# Patient Record
Sex: FEMALE | Race: BLACK OR AFRICAN AMERICAN | Employment: UNEMPLOYED | URBAN - METROPOLITAN AREA
[De-identification: names, ages, dates, MRNs, and addresses within clinical notes are randomized per-mention and may not be internally consistent; named-entity substitution may affect disease eponyms.]

---

## 2017-11-21 ENCOUNTER — OFFICE VISIT (OUTPATIENT)
Dept: ENDOCRINOLOGY | Age: 27
End: 2017-11-21

## 2017-11-21 VITALS
BODY MASS INDEX: 24.41 KG/M2 | HEIGHT: 63 IN | DIASTOLIC BLOOD PRESSURE: 78 MMHG | WEIGHT: 137.8 LBS | SYSTOLIC BLOOD PRESSURE: 120 MMHG | HEART RATE: 87 BPM

## 2017-11-21 DIAGNOSIS — R63.4 WEIGHT LOSS: ICD-10-CM

## 2017-11-21 DIAGNOSIS — R94.6 ABNORMAL THYROID FUNCTION TEST: Primary | ICD-10-CM

## 2017-11-21 NOTE — PATIENT INSTRUCTIONS
Hashimoto's Thyroiditis: Care Instructions  Your Care Instructions    Hashimoto's thyroiditis is a problem with the thyroid gland. The thyroid gland, which is in your neck, controls the way your body uses energy. Sometimes the disease causes the gland to make too much thyroid hormone (thyrotoxicosis). This can make you feel nervous, lose weight, and have many loose bowel movements. You may also have a fast heartbeat. But as the disease progresses, the gland usually does not make enough thyroid hormone. This can cause you to feel tired and have dry skin and thinning hair. Most people with Hashimoto's are diagnosed when they have these symptoms. You may need to take medicine if you have symptoms or if your thyroid hormone level is not normal. Most people with Hashimoto's thyroiditis need to take medicine for the rest of their lives. Follow-up care is a key part of your treatment and safety. Be sure to make and go to all appointments, and call your doctor if you are having problems. It's also a good idea to know your test results and keep a list of the medicines you take. How can you care for yourself at home? · Take your medicines exactly as prescribed. Call your doctor if you think you are having a problem with your medicine. You will get more details on the specific medicines your doctor prescribes. When should you call for help? Call 911 anytime you think you may need emergency care. For example, call if:  ? · You passed out (lost consciousness). ? · You have severe trouble breathing. ? · You have a very slow heartbeat (less than 60 beats a minute). ? · You have a low body temperature (95°F or below). ? Watch closely for changes in your health, and be sure to contact your doctor if:  ? · You gain weight even though you are eating normally or less than usual.   ? · You feel extremely weak or tired. ? · You have new changes in your skin, nails, or hair, or the changes get worse.    ? · You notice that your thyroid gland has grown or changed in size. ? · You have constipation that is new or that gets worse. ? · You cannot stand cold temperatures. ? · You have heavy or irregular menstrual periods. ? · You have other new symptoms. Where can you learn more? Go to http://chloé-netta.info/. Enter K454 in the search box to learn more about \"Hashimoto's Thyroiditis: Care Instructions. \"  Current as of: May 12, 2017  Content Version: 11.4  © 3254-9635 [employer]po[employer]. Care instructions adapted under license by [employer] (which disclaims liability or warranty for this information). If you have questions about a medical condition or this instruction, always ask your healthcare professional. [address] any warranty or liability for your use of this information.

## 2017-11-21 NOTE — MR AVS SNAPSHOT
Visit Information Date & Time Provider Department Dept. Phone Encounter #  
 11/21/2017  2:50 PM Axel Villar, [address] [facility] [phone] [id number] Follow-up Instructions Return in about 3 months (around 2/21/2018). Your Appointments 2/27/2018  3:50 PM  
Follow Up with MD Jignesh [facility] Appt Note: f/u           dm         3 month  
 [address] [facility] [address] [id number] [address] [facility] Upcoming Health Maintenance Date Due  
 HPV AGE 9Y-34Y (1 of 3 - Female 3 Dose Series) 8/28/2003 DTaP/Tdap/Td series (1 - Tdap) 8/28/2013 PAP AKA CERVICAL CYTOLOGY 8/28/2013 Influenza Age 5 to Adult 8/1/2017 Allergies as of 11/21/2017  Review Complete On: 11/21/2017 By: Axel Villar MD  
 No Known Allergies Current Immunizations  Never Reviewed No immunizations on file. Not reviewed this visit You Were Diagnosed With   
  
 Codes Comments Abnormal thyroid function test    -  Primary ICD-10-CM: R94.6 ICD-9-CM: 794.5 Weight loss     ICD-10-CM: R63.4 ICD-9-CM: 783.21 Vitals BP Pulse Height(growth percentile) Weight(growth percentile) BMI Smoking Status 120/78 87 5' 3\" (1.6 m) 137 lb 12.8 oz (62.5 kg) 24.41 kg/m2 Never Smoker Vitals History BMI and BSA Data Body Mass Index Body Surface Area  
 24.41 kg/m 2 1.67 m 2 Preferred Pharmacy Pharmacy Name Phone [facility] [address] [facility] [address] [facility] [phone] Your Updated Medication List  
  
   
This list is accurate as of: 11/21/17  3:31 PM.  Always use your most recent med list.  
  
  
  
  
 sertraline 50 mg tablet Commonly known as:  ZOLOFT Take  by mouth daily. We Performed the Following MD COLLECTION VENOUS BLOOD,VENIPUNCTURE L8699419 CPT(R)] SPECIMEN HANDLING, OFF->LAB [52961 CPT(R)]   
 T3 TOTAL [id number] CPT(R)] T4, FREE [id number] CPT(R)] THYROID ANTIBODY PANEL [56133 CPT(R)] THYROID STIMULATING IMMUNOGLOBULIN [id number] CPT(R)] TSH 3RD GENERATION [46884 CPT(R)] TSH RECEPTOR AB [03245 CPT(R)] Follow-up Instructions Return in about 3 months (around 2/21/2018). Patient Instructions Hashimoto's Thyroiditis: Care Instructions Your Care Instructions Hashimoto's thyroiditis is a problem with the thyroid gland. The thyroid gland, which is in your neck, controls the way your body uses energy. Sometimes the disease causes the gland to make too much thyroid hormone (thyrotoxicosis). This can make you feel nervous, lose weight, and have many loose bowel movements. You may also have a fast heartbeat. But as the disease progresses, the gland usually does not make enough thyroid hormone. This can cause you to feel tired and have dry skin and thinning hair. Most people with Hashimoto's are diagnosed when they have these symptoms. You may need to take medicine if you have symptoms or if your thyroid hormone level is not normal. Most people with Hashimoto's thyroiditis need to take medicine for the rest of their lives. Follow-up care is a key part of your treatment and safety. Be sure to make and go to all appointments, and call your doctor if you are having problems. It's also a good idea to know your test results and keep a list of the medicines you take. How can you care for yourself at home? · Take your medicines exactly as prescribed. Call your doctor if you think you are having a problem with your medicine. You will get more details on the specific medicines your doctor prescribes. When should you call for help? Call 911 anytime you think you may need emergency care. For example, call if: 
? · You passed out (lost consciousness). ? · You have severe trouble breathing. ? · You have a very slow heartbeat (less than 60 beats a minute). ? · You have a low body temperature (95°F or below). ? Watch closely for changes in your health, and be sure to contact your doctor if: 
? · You gain weight even though you are eating normally or less than usual.  
? · You feel extremely weak or tired. ? · You have new changes in your skin, nails, or hair, or the changes get worse. ? · You notice that your thyroid gland has grown or changed in size. ? · You have constipation that is new or that gets worse. ? · You cannot stand cold temperatures. ? · You have heavy or irregular menstrual periods. ? · You have other new symptoms. Where can you learn more? Go to http://chloé-netta.info/. Enter K454 in the search box to learn more about \"Hashimoto's Thyroiditis: Care Instructions. \" Current as of: May 12, 2017 Content Version: 11.4 © 9976-9580 Actionality. Care instructions adapted under license by [employer] (which disclaims liability or warranty for this information). If you have questions about a medical condition or this instruction, always ask your healthcare professional. [address] any warranty or liability for your use of this information. Introducing [facility]! Della Goodman introduces Welliko patient portal. Now you can access parts of your medical record, email your doctor's office, and request medication refills online. 1. In your internet browser, go to [email or web address] 2. Click on the First Time User? Click Here link in the Sign In box. You will see the New Member Sign Up page. 3. Enter your Welliko Access Code exactly as it appears below. You will not need to use this code after youve completed the sign-up process. If you do not sign up before the expiration date, you must request a new code. · SpringCM Access Code: [email or web address] Expires: 1/12/2018  5:19 PM 
 
4. Enter the last four digits of your Social Security Number (xxxx) and Date of Birth (mm/dd/yyyy) as indicated and click Submit. You will be taken to the next sign-up page. 5. Create a SpringCM ID. This will be your SpringCM login ID and cannot be changed, so think of one that is secure and easy to remember. 6. Create a SpringCM password. You can change your password at any time. 7. Enter your Password Reset Question and Answer. This can be used at a later time if you forget your password. 8. Enter your e-mail address. You will receive e-mail notification when new information is available in [address] 9. Click Sign Up. You can now view and download portions of your medical record. 10. Click the Download Summary menu link to download a portable copy of your medical information. If you have questions, please visit the Frequently Asked Questions section of the SpringCM website. Remember, SpringCM is NOT to be used for urgent needs. For medical emergencies, dial 911. Now available from your iPhone and Android! Please provide this summary of care documentation to your next provider. Your primary care clinician is listed as Willena Elk. If you have any questions after today's visit, please call [phone].

## 2017-11-21 NOTE — PROGRESS NOTES
Chief Complaint   Patient presents with    Thyroid Problem     pcp    Records reviewed  History of Present Illness: Sharon Davidson is a 24 y.o. female who I was asked to see in consult by Dr. Pauline Bethea for evaluation of weight loss and concern for possible thyroid problems. Pt is Autistic, but is moderate functioning. Her father notes that her weight loss issues began about a year ago, she had lost about 30 pounds in 8 months. Her PCP has been working her up for causes of her weight loss and \"they were off and she thought that could be contributing to her weight issues\". Her father notes she was down to 133 and is back up to 137 pounds. In April 2017 her TSH was 1.09 with FT4 of 0.07. In October 2017 her PCP checked labs and her TSH was 0.452 with FT4 of 1.69. Her father denies any complaints of CP, SOB, pain with swallowing, sore throat. He denies any recent issues of cough, cold or recent viral URI/viral infections. He denies any recent changes in mood from her baseline. She is on sertraline for mood issues and her father notes she has always had a tremor or movement issues because of her autism. Her father notes that she tends to get a rash on her neck and she was told that Mai Mendoza may be allergic to herself\" the rash is always on the left side of her neck, behind her ears. \"    ROS and history obtained from her father. Past Medical History:   Diagnosis Date    Autism     Psychiatric disorder     depression     No past surgical history on file. Current Outpatient Prescriptions   Medication Sig    sertraline (ZOLOFT) 50 mg tablet Take  by mouth daily. No current facility-administered medications for this visit.       No Known Allergies  Family History   Problem Relation Age of Onset    Hypertension Father     Hypertension Paternal Grandmother     Cancer Paternal Grandfather      lung     Social History     Social History    Marital status: SINGLE     Spouse name: N/A    Number of children: N/A    Years of education: N/A     Occupational History    Not on file. Social History Main Topics    Smoking status: Never Smoker    Smokeless tobacco: Never Used    Alcohol use No    Drug use: Not on file    Sexual activity: Not on file     Other Topics Concern    Not on file     Social History Narrative     Review of Systems:  - Constitutional Symptoms: no fevers, chills, weight loss  - Eyes: no blurry vision or double vision  - Cardiovascular: no chest pain or palpitations  - Respiratory: no cough or shortness of breath  - Gastrointestinal: no dysphagia or abdominal pain  - Musculoskeletal: no joint pains or weakness  - Integumentary: no rashes  - Neurological: no numbness, tingling, or headaches  - Psychiatric: no depression or anxiety  - Endocrine: no heat or cold intolerance, no polyuria or polydipsia    Physical Examination:  Blood pressure 120/78, pulse 87, height 5' 3\" (1.6 m), weight 137 lb 12.8 oz (62.5 kg).   - General: pleasant, no distress, good eye contact  - HEENT: no exopthalmos, no periorbital edema, no scleral/conjunctival injection, EOMI, no lid lag or stare  - Neck: supple, no thyromegaly, masses, lymph nodes, or carotid bruits, no supraclavicular or dorsocervical fat pads  - Cardiovascular: regular, normal rate, normal S1 and S2, no murmurs/rubs/gallops   - Respiratory: clear to auscultation bilaterally  - Gastrointestinal: soft, nontender, nondistended, no masses, no hepatosplenomegaly  - Musculoskeletal: no proximal muscle weakness in upper or lower extremities  - Integumentary: no acanthosis nigricans, no abdominal striae, no rashes, no edema  - Neurological: reflexes 2+ at biceps, no tremor  - Psychiatric: normal mood and affect    Data Reviewed:   Component      Latest Ref Rng & Units 4/19/2017 4/19/2017 4/19/2017 4/19/2017           7:15 PM  7:15 PM  7:15 PM  7:15 PM   Sodium      136 - 145 mmol/L    143   Potassium      3.5 - 5.1 mmol/L    3.1 (L)   Chloride      97 - 108 mmol/L    106   CO2      21 - 32 mmol/L    23   Anion gap      5 - 15 mmol/L    14   Glucose      65 - 100 mg/dL    112 (H)   BUN      7.0 - 18.0 MG/DL    11   Creatinine      0.55 - 1.02 MG/DL    0.72   BUN/Creatinine ratio      12 - 20      15   GFR est AA      >60 ml/min/1.73m2    >60   GFR est non-AA      >60 ml/min/1.73m2    >60   Calcium      8.5 - 10.1 MG/DL    8.7   Bilirubin, total      0.2 - 1.0 MG/DL    0.4   ALT (SGPT)      14 - 63 U/L    18   AST      15 - 37 U/L    10 (L)   Alk. phosphatase      45 - 117 U/L    77   Protein, total      6.4 - 8.2 g/dL    7.9   Albumin      3.5 - 5.0 g/dL    3.8   Globulin      2.0 - 4.0 g/dL    4.1 (H)   A-G Ratio      1.1 - 2.2      0.9 (L)   Magnesium      1.6 - 2.4 mg/dL   2.1    TSH      0.34 - 4.82 uIU/mL  1.09     T4, Free      0.59 - 1.17 NG/DL 0.7          Assessment/Plan:   1. Abnormal thyroid function test    2. Weight loss    1) Her TSH was normal in April and high in October, this makes me think she could be having issues of thyroiditis, which could cause erratic thyroid labs with periods of hyperthyroid followed by periods of hypothyroidism. Will check TSH, FT4, TT3, Thyroid Ab panel, TSI and TRAb looking for underlying cause of her thyroid abnormality. 2) For her issues of weight loss, will start by addressing #1 and if she continues to have weight loss with normal thyroid function then we can look for other endocrine causes of weight loss. Pt's father voices understanding and agreement with the plan. RTC 3 months    Will call her father Skyler Dubois) [phone] or [phone] with results. Patient Instructions          Hashimoto's Thyroiditis: Care Instructions  Your Care Instructions    Hashimoto's thyroiditis is a problem with the thyroid gland. The thyroid gland, which is in your neck, controls the way your body uses energy. Sometimes the disease causes the gland to make too much thyroid hormone (thyrotoxicosis).  This can make you feel nervous, lose weight, and have many loose bowel movements. You may also have a fast heartbeat. But as the disease progresses, the gland usually does not make enough thyroid hormone. This can cause you to feel tired and have dry skin and thinning hair. Most people with Hashimoto's are diagnosed when they have these symptoms. You may need to take medicine if you have symptoms or if your thyroid hormone level is not normal. Most people with Hashimoto's thyroiditis need to take medicine for the rest of their lives. Follow-up care is a key part of your treatment and safety. Be sure to make and go to all appointments, and call your doctor if you are having problems. It's also a good idea to know your test results and keep a list of the medicines you take. How can you care for yourself at home? · Take your medicines exactly as prescribed. Call your doctor if you think you are having a problem with your medicine. You will get more details on the specific medicines your doctor prescribes. When should you call for help? Call 911 anytime you think you may need emergency care. For example, call if:  ? · You passed out (lost consciousness). ? · You have severe trouble breathing. ? · You have a very slow heartbeat (less than 60 beats a minute). ? · You have a low body temperature (95°F or below). ? Watch closely for changes in your health, and be sure to contact your doctor if:  ? · You gain weight even though you are eating normally or less than usual.   ? · You feel extremely weak or tired. ? · You have new changes in your skin, nails, or hair, or the changes get worse. ? · You notice that your thyroid gland has grown or changed in size. ? · You have constipation that is new or that gets worse. ? · You cannot stand cold temperatures. ? · You have heavy or irregular menstrual periods. ? · You have other new symptoms. Where can you learn more?   Go to http://chloé-netta.info/. Enter K454 in the search box to learn more about \"Hashimoto's Thyroiditis: Care Instructions. \"  Current as of: May 12, 2017  Content Version: 11.4  © 5738-9963 [employer]. Care instructions adapted under license by [employer] (which disclaims liability or warranty for this information). If you have questions about a medical condition or this instruction, always ask your healthcare professional. [place] [address] any warranty or liability for your use of this information. Follow-up Disposition:  Return in about 3 months (around 2/21/2018).     Copy sent to:  Dr. Bogdan Downs

## 2017-11-21 NOTE — LETTER
11/21/2017 3:30 PM 
 
Patient:  Shlomo De La Torre YOB: 1990 Date of Visit: 11/21/2017 Dear Roberto Block, DONY 
[address] [facility] [address] V[address] Facsimile: [phone] 
 : Thank you for referring Ms. Shlomo De La Torre to me for evaluation/treatment. Below are the relevant portions of my assessment and plan of care. If you have questions, please do not hesitate to call me. I look forward to following Ms. Halina Stokeselías along with you. Sincerely, Filiberto Munoz MD

## 2017-11-28 LAB
T3 SERPL-MCNC: 92 NG/DL (ref 71–180)
T4 FREE SERPL-MCNC: 1.17 NG/DL (ref 0.82–1.77)
THYROGLOB AB SERPL-ACNC: <1 IU/ML (ref 0–0.9)
THYROPEROXIDASE AB SERPL-ACNC: 11 IU/ML (ref 0–34)
TSH RECEP AB SER-ACNC: <0.5 IU/L (ref 0–1.75)
TSH SERPL DL<=0.005 MIU/L-ACNC: 1.89 UIU/ML (ref 0.45–4.5)
TSI ACT/NOR SER: 58 % (ref 0–139)

## 2017-11-29 NOTE — PROGRESS NOTES
Letter sent to her father. Ms Lugo's thyroid labs are normal. I suspect she has an episode of thyroiditis, which has since resolved, with normalization of her thyroid function. No medications or treatments are needed at this time.

## 2018-02-27 ENCOUNTER — OFFICE VISIT (OUTPATIENT)
Dept: ENDOCRINOLOGY | Age: 28
End: 2018-02-27

## 2018-02-27 VITALS
BODY MASS INDEX: 23.88 KG/M2 | SYSTOLIC BLOOD PRESSURE: 132 MMHG | WEIGHT: 134.8 LBS | DIASTOLIC BLOOD PRESSURE: 76 MMHG | HEIGHT: 63 IN | HEART RATE: 106 BPM

## 2018-02-27 DIAGNOSIS — R63.4 WEIGHT LOSS: Primary | ICD-10-CM

## 2018-02-27 NOTE — PROGRESS NOTES
Chief Complaint   Patient presents with    Thyroid Problem     pcp and pharmacy verified   Records since last visit reviewed. History of Present Illness: Bubba Gonzalez is a 24 y.o. female here for follow up of evaluation of weight loss and concern for possible thyroid problems. Pt is Autistic, but is moderate functioning. History was provided by her father. Her father notes that her weight loss issues began about a year ago, she had lost about 30 pounds in 8 months. Her PCP has been working her up for causes of her weight loss and \"they were off and she thought that could be contributing to her weight issues\". In April 2017 her TSH was 1.09 with FT4 of 0.07. In October 2017 her PCP checked labs and her TSH was 0.452 with FT4 of 1.69. At our initial visit in November 2017 evaluated for abnormalities in her TFTs, evidence of Graves' or Hashimoto's. All of her labs came back normal.  This suggested that she could have had an episode of thyroiditis that had since resolved. Today pt weighs 134, which is down 3 pounds from our last visit in November 2017. Her BMI is 23.9. She has follow up with her PCP tomorrow. Her father notes that she is only eating one or two meals per day. She typically eats frozen meals and hot pockets. Her father notes that she has been having issues of nasal congestion and sinus issues. Pt and her father denies issues of polyuria, polydipsia, N/V, lightheadedness, syncope, pre-syncope, HA, CP, SOB, palpitations, tremors, diarrhea, constipation. She is on Depo-Provera. Current Outpatient Prescriptions   Medication Sig    sertraline (ZOLOFT) 50 mg tablet Take  by mouth daily. No current facility-administered medications for this visit.       No Known Allergies  Review of Systems:  - Cardiovascular: no chest pain  - Neurological: no tremors  - Integumentary: skin is normal    Physical Examination:  Blood pressure 132/76, pulse (!) 106, height 5' 3\" (1.6 m), weight 134 lb 12.8 oz (61.1 kg). - General: pleasant, no distress, good eye contact   - Neck: no thyromegaly or thyroid bruits  - Cardiovascular: regular, normal rate, nl s1 and s2, no m/r/g   - Integumentary: skin is normal, no edema  - Neurological: reflexes 2+ at biceps, no tremors  - Psychiatric: normal mood and affect    Data Reviewed:   - none new for review    Assessment/Plan:   1) Weight loss > I explained to her father that her weight is actually in a healthy range for a woman of her age and height (BMI 23.9). He is concerned that she is underweight and has not \"filled out the way she used to be\". She does not have signs or symptoms concerning for cushing, adrenal insufficiency, hyperthyroidism, acromegaly or diabetes. To be through will check an A1C, repeat TFTs, A1C and metanephrine levels. I recommended her father  Boost or Ensure meal replacement shakes and give her one per day. This will give her protein, nutrients as well as some extra calories. I also emphasized that I felt she was at a healthy weight. RTC based on the results of the above labs.     Copy sent to:  Dr. Rojas Lovelace

## 2018-02-27 NOTE — MR AVS SNAPSHOT
[address] [facility] [address] [address] [phone] Patient: Eulalia Gallardo MRN: [id number] Nazia Mendoza Visit Information Date & Time Provider Department Dept. Phone Encounter #  
 2/27/2018  3:50 PM Marleny Neal, [facility] [id number] [phone] Your Appointments 2/27/2018  3:50 PM  
Follow Up with Marleny Neal MD  
[facility] [address] [place] [address]) Appt Note: f/u           dm         3 month  
 [address] [facility] [address] [address] [address] [facility] Upcoming Health Maintenance Date Due  
 HPV AGE 9Y-34Y (1 of 3 - Female 3 Dose Series) 8/28/2003 DTaP/Tdap/Td series (1 - Tdap) 8/28/2013 PAP AKA CERVICAL CYTOLOGY 8/28/2013 Allergies as of 2/27/2018  Review Complete On: 2/27/2018 By: Erasmo Lugo LPN No Known Allergies Current Immunizations  Never Reviewed No immunizations on file. Not reviewed this visit You Were Diagnosed With   
  
 Codes Comments Weight loss    -  Primary ICD-10-CM: R63.4 ICD-9-CM: 783.21 Vitals BP Pulse Height(growth percentile) Weight(growth percentile) BMI Smoking Status 132/76 (!) 106 5' 3\" (1.6 m) 134 lb 12.8 oz (61.1 kg) 23.88 kg/m2 Never Smoker Vitals History BMI and BSA Data Body Mass Index Body Surface Area  
 23.88 kg/m 2 1.65 m 2 Preferred Pharmacy Pharmacy Name Phone [facility] [place] [address] [facility] [phone] Your Updated Medication List  
  
   
This list is accurate as of 2/27/18  3:48 PM.  Always use your most recent med list.  
  
  
  
  
 sertraline 50 mg tablet Commonly known as:  ZOLOFT Take  by mouth daily. We Performed the Following HEMOGLOBIN A1C WITH EAG [44238 CPT(R)] IGF-1 [id number] CPT(R)] METABOLIC PANEL, COMPREHENSIVE [02356 CPT(R)] METANEPHRINES, PLASMA [14850 CPT(R)]   
 T3 TOTAL [52041 CPT(R)] T4, FREE [id number] CPT(R)] TSH 3RD GENERATION [80271 CPT(R)] Patient Instructions 1) Boost meal replacement shakes. 2) Ensure Introducing [facility]! Kath Rivas introduces RawData patient portal. Now you can access parts of your medical record, email your doctor's office, and request medication refills online. 1. In your internet browser, go to [email or web address] 2. Click on the First Time User? Click Here link in the Sign In box. You will see the New Member Sign Up page. 3. Enter your RawData Access Code exactly as it appears below. You will not need to use this code after youve completed the sign-up process. If you do not sign up before the expiration date, you must request a new code. · RawData Access Code: [email or web address] Expires: 5/28/2018  3:48 PM 
 
4. Enter the last four digits of your Social Security Number (xxxx) and Date of Birth (mm/dd/yyyy) as indicated and click Submit. You will be taken to the next sign-up page. 5. Create a RawData ID. This will be your RawData login ID and cannot be changed, so think of one that is secure and easy to remember. 6. Create a [employer]Data password. You can change your password at any time. 7. Enter your Password Reset Question and Answer. This can be used at a later time if you forget your password. 8. Enter your e-mail address. You will receive e-mail notification when new information is available in [address] 9. Click Sign Up. You can now view and download portions of your medical record. 10. Click the Download Summary menu link to download a portable copy of your medical information. If you have questions, please visit the Frequently Asked Questions section of the [employer] website.  Remember, RawData is NOT to be used for urgent needs. For medical emergencies, dial 911. Now available from your iPhone and Android! Please provide this summary of care documentation to your next provider. Your primary care clinician is listed as Lucero White. If you have any questions after today's visit, please call [phone].

## 2018-03-04 LAB
ALBUMIN SERPL-MCNC: 4.7 G/DL (ref 3.5–5.5)
ALBUMIN/GLOB SERPL: 1.8 {RATIO} (ref 1.2–2.2)
ALP SERPL-CCNC: 57 IU/L (ref 39–117)
ALT SERPL-CCNC: 6 IU/L (ref 0–32)
AST SERPL-CCNC: 14 IU/L (ref 0–40)
BILIRUB SERPL-MCNC: 0.4 MG/DL (ref 0–1.2)
BUN SERPL-MCNC: 12 MG/DL (ref 6–20)
BUN/CREAT SERPL: 16 (ref 9–23)
CALCIUM SERPL-MCNC: 9.4 MG/DL (ref 8.7–10.2)
CHLORIDE SERPL-SCNC: 102 MMOL/L (ref 96–106)
CO2 SERPL-SCNC: 22 MMOL/L (ref 18–29)
CREAT SERPL-MCNC: 0.76 MG/DL (ref 0.57–1)
EST. AVERAGE GLUCOSE BLD GHB EST-MCNC: 94 MG/DL
GFR SERPLBLD CREATININE-BSD FMLA CKD-EPI: 109 ML/MIN/1.73
GFR SERPLBLD CREATININE-BSD FMLA CKD-EPI: 126 ML/MIN/1.73
GLOBULIN SER CALC-MCNC: 2.6 G/DL (ref 1.5–4.5)
GLUCOSE SERPL-MCNC: 96 MG/DL (ref 65–99)
HBA1C MFR BLD: 4.9 % (ref 4.8–5.6)
IGF-I SERPL-MCNC: 363 NG/ML
METANEPH FREE SERPL-MCNC: 19 PG/ML (ref 0–62)
NORMETANEPHRINE SERPL-MCNC: 67 PG/ML (ref 0–145)
POTASSIUM SERPL-SCNC: 3.7 MMOL/L (ref 3.5–5.2)
PROT SERPL-MCNC: 7.3 G/DL (ref 6–8.5)
SODIUM SERPL-SCNC: 141 MMOL/L (ref 134–144)
T3 SERPL-MCNC: 86 NG/DL (ref 71–180)
T4 FREE SERPL-MCNC: 1.05 NG/DL (ref 0.82–1.77)
TSH SERPL DL<=0.005 MIU/L-ACNC: 2.78 UIU/ML (ref 0.45–4.5)

## 2018-12-01 ENCOUNTER — HOSPITAL ENCOUNTER (EMERGENCY)
Age: 28
Discharge: HOME | End: 2018-12-01
Attending: EMERGENCY MEDICINE
Payer: MEDICAID

## 2018-12-01 VITALS
WEIGHT: 165.34 LBS | OXYGEN SATURATION: 99 % | SYSTOLIC BLOOD PRESSURE: 159 MMHG | HEIGHT: 63 IN | HEART RATE: 110 BPM | DIASTOLIC BLOOD PRESSURE: 97 MMHG | RESPIRATION RATE: 16 BRPM | TEMPERATURE: 97.7 F | BODY MASS INDEX: 29.3 KG/M2

## 2018-12-01 DIAGNOSIS — L25.9 CONTACT DERMATITIS AND ECZEMA: ICD-10-CM

## 2018-12-01 DIAGNOSIS — L01.00 IMPETIGO: Primary | ICD-10-CM

## 2018-12-01 PROCEDURE — 99283 EMERGENCY DEPT VISIT LOW MDM: CPT

## 2018-12-01 PROCEDURE — 74011250637 HC RX REV CODE- 250/637: Performed by: EMERGENCY MEDICINE

## 2018-12-01 RX ORDER — MUPIROCIN 20 MG/G
OINTMENT TOPICAL 3 TIMES DAILY
Qty: 22 G | Refills: 0 | Status: SHIPPED | OUTPATIENT
Start: 2018-12-01 | End: 2021-07-14

## 2018-12-01 RX ORDER — HYDROXYZINE 25 MG/1
25 TABLET, FILM COATED ORAL
Qty: 20 TAB | Refills: 0 | Status: SHIPPED | OUTPATIENT
Start: 2018-12-01 | End: 2018-12-11

## 2018-12-01 RX ORDER — HYDROXYZINE 25 MG/1
25 TABLET, FILM COATED ORAL
Status: COMPLETED | OUTPATIENT
Start: 2018-12-01 | End: 2018-12-01

## 2018-12-01 RX ADMIN — HYDROXYZINE HYDROCHLORIDE 25 MG: 25 TABLET, FILM COATED ORAL at 15:00

## 2018-12-01 NOTE — ED NOTES
Discharge paper work given to patient by Mohamud Ferrell MD patients questions and concerns answered. Patient ambulatory at time of discharge with no difficulty. Patient discharged

## 2018-12-01 NOTE — ED PROVIDER NOTES
Patient Name: Yao Gould History of Presenting Illness Chief Complaint Patient presents with  Rash Ambulatory into the ED with c/o dry/flaky rash to face and chest x 11/21. History Provided By: Patient and Patient's Father HPI: Yao Gould, 34 y.o. female with PMHx significant for autism, presents ambulatory to the ED with cc of new onset rash which began  11/21/18 and worsened yesterday prompting ED visit today. Father states rash originally began on the back of the neck and has migrated around the neck and up to the face and ears. Pt's father denies any recent changes in medications and allergies. Pt's was seen at [facility] ED on 11/24/2018. Pt at the time had been using eczema cream with no relief. Pt was prescribed keflex, but has finished with no relief in sx. Pt specifically denies neck pain, SOB, and chest pain. Social Hx: - Tobacco, - EtOH, - Illicit Drugs There are no other complaints, changes, or physical findings at this time. PCP: Pearl Gallagher NP Current Outpatient Medications Medication Sig Dispense Refill  mupirocin (BACTROBAN) 2 % ointment Apply  to affected area three (3) times daily. Apply to area for 10 days 22 g 0  
 hydrOXYzine HCl (ATARAX) 25 mg tablet Take 1 Tab by mouth every six (6) hours as needed for Itching for up to 10 days. 20 Tab 0  predniSONE (STERAPRED) 5 mg dose pack As directed. May fill with loose pills and appropriate dosing instructions if prepackaged dose pack unavailable. 48 Tab 0  cephALEXin (KEFLEX) 500 mg capsule Take 1 Cap by mouth three (3) times daily. 15 Cap 0  
 sertraline (ZOLOFT) 50 mg tablet Take  by mouth daily. Past History Past Medical History: 
Past Medical History:  
Diagnosis Date  ADD (attention deficit disorder)   
 from [facility] Hx  Autism  Moderate mental retardation   
 from [facility] History  Psychiatric disorder   
 depression Past Surgical History: No past surgical history on file. Family History: 
Family History Problem Relation Age of Onset  Hypertension Father  Hypertension Paternal Grandmother  Cancer Paternal Grandfather   
     lung Social History: 
Social History Tobacco Use  Smoking status: Never Smoker  Smokeless tobacco: Never Used Substance Use Topics  Alcohol use: No  
 Drug use: Not on file Allergies: 
No Known Allergies Review of Systems Review of Systems Constitutional: Negative for fever. HENT: Negative for sore throat. Eyes: Negative. Respiratory: Negative for shortness of breath. Cardiovascular: Negative for chest pain. Gastrointestinal: Negative. Endocrine: Negative for heat intolerance. Genitourinary: Negative for dysuria. Musculoskeletal: Negative for back pain and neck pain. Skin: Positive for rash (neck, ears, face). Allergic/Immunologic: Negative for immunocompromised state. Neurological: Negative. Hematological: Does not bruise/bleed easily. Psychiatric/Behavioral: Negative. All other systems reviewed and are negative. Physical Exam  
Physical Exam  
Constitutional: She is oriented to person, place, and time. She appears well-developed and well-nourished. No distress. HENT:  
Head: Normocephalic and atraumatic. Mouth/Throat: Oropharynx is clear and moist.  
Eyes: EOM are normal. Pupils are equal, round, and reactive to light. Neck: Normal range of motion. Cardiovascular: Normal rate, regular rhythm and normal heart sounds. Pulmonary/Chest: Effort normal and breath sounds normal. No respiratory distress. Abdominal: Soft. Bowel sounds are normal. She exhibits no mass. There is no tenderness. Musculoskeletal: Normal range of motion. She exhibits no edema. Neurological: She is alert and oriented to person, place, and time. Coordination normal.  
Skin: Skin is warm and dry.  Rash (eczematous appearing rash on face, neck, and ears with crusting on the neck and ears) noted. Psychiatric: She has a normal mood and affect. Her behavior is normal.  
Nursing note and vitals reviewed. Medical Decision Making I am the first provider for this patient. I reviewed the vital signs, available nursing notes, past medical history, past surgical history, family history and social history. Vital Signs-Reviewed the patient's vital signs. Patient Vitals for the past 12 hrs: 
 Temp Pulse Resp BP SpO2  
12/01/18 1259 97.7 °F (36.5 °C) (!) 110 16 (!) 159/97 99 % Pulse Oximetry Analysis - 99% on RA Cardiac Monitor:  
Rate: 110 bpm 
Rhythm: Sinus Tachycardia Records Reviewed: Nursing Notes, Old Medical Records and Previous Radiology Studies Provider Notes (Medical Decision Making): DDx: Contact dermatitis, impetigo, allergic rxn ED Course:  
Initial assessment performed. The patients presenting problems have been discussed, and they are in agreement with the care plan formulated and outlined with them. I have encouraged them to ask questions as they arise throughout their visit. Critical Care Time: 0 minutes Disposition: 
Discharge Note: 
3:04 PM 
The pt is ready for discharge. The pt's signs, symptoms, diagnosis, and discharge instructions have been discussed and pt has conveyed their understanding. The pt is to follow up as recommended or return to ER should their symptoms worsen. Plan has been discussed and pt is in agreement. PLAN: 
1. Discharge Home Current Discharge Medication List  
  
START taking these medications Details  
mupirocin (BACTROBAN) 2 % ointment Apply  to affected area three (3) times daily. Apply to area for 10 days Qty: 22 g, Refills: 0  
  
hydrOXYzine HCl (ATARAX) 25 mg tablet Take 1 Tab by mouth every six (6) hours as needed for Itching for up to 10 days. Qty: 20 Tab, Refills: 0  
  
  
 
2. Follow-up Information Follow up With Specialties Details Why Contact Florinda Burk NP Nurse Practitioner In 2 days As needed [address] [facility] 
[phone] Leonides Bae, DO Dermatology  As needed [address] [facility] 7 [id number] [phone] [facility] EMERGENCY DEPT Emergency Medicine  If symptoms worsen [address] [facility] 
[phone] Return to ED if worse Diagnosis Clinical Impression: 1. Impetigo 2. Contact dermatitis and eczema Attestations: This note is prepared by Lucas Velasco, acting as Scribe for Elke Frost MD. 
 
The scribe's documentation has been prepared under my direction and personally reviewed by me in its entirety. I confirm that the note above accurately reflects all work, treatment, procedures, and medical decision making performed by me.  
Elke Frost MD

## 2018-12-01 NOTE — DISCHARGE INSTRUCTIONS
Dermatitis: Care Instructions  Your Care Instructions  Dermatitis is the general name used for any rash or inflammation of the skin. Different kinds of dermatitis cause different kinds of rashes. Common causes of a rash include new medicines, plants (such as poison oak or poison ivy), heat, and stress. Certain illnesses can also cause a rash. An allergic reaction to something that touches your skin, such as latex, nickel, or poison ivy, is called contact dermatitis. Contact dermatitis may also be caused by something that irritates the skin, such as bleach, a chemical, or soap. These types of rashes cannot be spread from person to person. How long your rash will last depends on what caused it. Rashes may last a few days or months. Follow-up care is a key part of your treatment and safety. Be sure to make and go to all appointments, and call your doctor if you are having problems. It's also a good idea to know your test results and keep a list of the medicines you take. How can you care for yourself at home? · Do not scratch the rash. Cut your nails short, and file them smooth. Or wear gloves if this helps keep you from scratching. · Wash the area with water only. Pat dry. · Put cold, wet cloths on the rash to reduce itching. · Keep cool, and stay out of the sun. · Leave the rash open to the air as much as possible. · If the rash itches, use hydrocortisone cream. Follow the directions on the label. Calamine lotion may help for plant rashes. · Take an over-the-counter antihistamine, such as diphenhydramine (Benadryl) or loratadine (Claritin), to help calm the itching. Read and follow all instructions on the label. · If your doctor prescribed a cream, use it as directed. If your doctor prescribed medicine, take it exactly as directed. When should you call for help?   Call your doctor now or seek immediate medical care if:    · You have symptoms of infection, such as:  ? Increased pain, swelling, warmth, or redness. ? Red streaks leading from the area. ? Pus draining from the area. ? A fever.     · You have joint pain along with the rash.    Watch closely for changes in your health, and be sure to contact your doctor if:    · Your rash is changing or getting worse.     · You are not getting better as expected. Where can you learn more? Go to http://chloé-netta.info/. Enter [phone] in the search box to learn more about \"Dermatitis: Care Instructions. \"  Current as of: April 18, 2018  Content Version: 11.8  © 6435-9330 [employer]. Care instructions adapted under license by [employer] (which disclaims liability or warranty for this information). If you have questions about a medical condition or this instruction, always ask your healthcare professional. [address] any warranty or liability for your use of this information. Impetigo: Care Instructions  Your Care Instructions  Impetigo (say \"yg-arz-KT-go\") is a skin infection caused by bacteria. It causes blisters that break and become oozing, yellow, crusty sores. Impetigo can be anywhere on the body. Scratching the sores may spread the infection to other parts of the body. You can also spread it to others through close contact or when you share towels, clothing, and other items. Prescription antibiotic ointment or pills can usually cure impetigo. (After a day of antibiotics, the infection should not spread.)  Follow-up care is a key part of your treatment and safety. Be sure to make and go to all appointments, and call your doctor if you are having problems. It's also a good idea to know your test results and keep a list of the medicines you take. How can you care for yourself at home? · Apply antibiotic ointment exactly as instructed. · If your doctor prescribed antibiotic pills, take them as directed. Do not stop using them just because you feel better.  You need to take the full course of antibiotics. · Gently wash the sores with soap and water each day. If crusts form, your doctor may advise you to soften or remove the crusts. You can do this by soaking them in warm water and patting them dry. This can help the cream or ointment treat impetigo. · After you touch the area, wash your hands with soap and water. Or you can use an alcohol-based hand . · Don't share items such as towels, sheets, and clothing until the infection is gone. · Wash anything that may have touched the infected area. · Try to avoid scratching the area. When should you call for help? Call your doctor now or seek immediate medical care if:    · You have symptoms of a worse infection, such as:  ? Increased pain, swelling, warmth, or redness. ? Red streaks leading from the area. ? Pus draining from the area. ? A fever.     · Impetigo gets worse or spreads to other areas.    Watch closely for changes in your health, and be sure to contact your doctor if:    · You do not get better as expected. Where can you learn more? Go to http://chloé-netta.info/. Enter U202 in the search box to learn more about \"Impetigo: Care Instructions. \"  Current as of: March 28, 2018  Content Version: 11.8  © 5436-8955 [employer]. Care instructions adapted under license by [employer] (which disclaims liability or warranty for this information). If you have questions about a medical condition or this instruction, always ask your healthcare professional. [place] [address] any warranty or liability for your use of this information.

## 2021-06-22 ENCOUNTER — TELEPHONE (OUTPATIENT)
Dept: FAMILY MEDICINE CLINIC | Age: 31
End: 2021-06-22

## 2021-06-22 NOTE — TELEPHONE ENCOUNTER
----- Message from Gopi Haywood sent at 6/22/2021 10:04 AM EDT -----  Regarding: Dr. Trudi Callahan  General Message/Vendor Calls    Caller's first and last name:   Khoi Davila -Father    Reason for call: Question      Callback required yes/no and why: yes, if needed      Best contact number(s):  [phone]      Details to clarify the request:  Pt father would like new patient paper work mailed to the house to fill out prior to the appointment on 7/14/21.     42 Jaelyn Haywood

## 2021-07-14 ENCOUNTER — OFFICE VISIT (OUTPATIENT)
Dept: FAMILY MEDICINE CLINIC | Age: 31
End: 2021-07-14
Payer: MEDICAID

## 2021-07-14 VITALS
DIASTOLIC BLOOD PRESSURE: 71 MMHG | RESPIRATION RATE: 18 BRPM | OXYGEN SATURATION: 100 % | HEART RATE: 86 BPM | HEIGHT: 63 IN | SYSTOLIC BLOOD PRESSURE: 117 MMHG | WEIGHT: 151.13 LBS | BODY MASS INDEX: 26.78 KG/M2 | TEMPERATURE: 98.1 F

## 2021-07-14 DIAGNOSIS — F84.0 AUTISM: ICD-10-CM

## 2021-07-14 DIAGNOSIS — Z11.59 ENCOUNTER FOR HEPATITIS C SCREENING TEST FOR LOW RISK PATIENT: ICD-10-CM

## 2021-07-14 DIAGNOSIS — F98.8 ATTENTION DEFICIT DISORDER (ADD) WITHOUT HYPERACTIVITY: ICD-10-CM

## 2021-07-14 DIAGNOSIS — Z86.59 HISTORY OF DEPRESSION: ICD-10-CM

## 2021-07-14 DIAGNOSIS — R63.4 ABNORMAL WEIGHT LOSS: Primary | ICD-10-CM

## 2021-07-14 DIAGNOSIS — F79 MENTALLY CHALLENGED: ICD-10-CM

## 2021-07-14 PROCEDURE — 99214 OFFICE O/P EST MOD 30 MIN: CPT | Performed by: FAMILY MEDICINE

## 2021-07-14 RX ORDER — GLUCOSAMINE SULFATE 1500 MG
POWDER IN PACKET (EA) ORAL DAILY
COMMUNITY

## 2021-07-14 RX ORDER — MEDROXYPROGESTERONE ACETATE 150 MG/ML
150 INJECTION, SUSPENSION INTRAMUSCULAR
COMMUNITY
Start: 2020-11-04

## 2021-07-14 NOTE — PROGRESS NOTES
Andrzej Bowman is a 31 y.o. female who presents with the following:  Chief Complaint   Patient presents with    Weight Loss       Father brought the young lady and because she had been losing weight abnormally but states now she is beginning to put some weight back on. Patient has a history of depression autism moderate mental retardation and attention deficit disorder. No Known Allergies    Current Outpatient Medications   Medication Sig    medroxyPROGESTERone (DEPO-PROVERA) 150 mg/mL injection 150 mg by IntraMUSCular route.  cholecalciferol (Vitamin D3) 25 mcg (1,000 unit) cap Take  by mouth daily.  sertraline (ZOLOFT) 50 mg tablet Take  by mouth daily. No current facility-administered medications for this visit. Past Medical History:   Diagnosis Date    ADD (attention deficit disorder)     from [place] Hx    Autism     Moderate mental retardation     from [place] History    Psychiatric disorder     depression       No past surgical history on file. Family History   Problem Relation Age of Onset    Hypertension Father     Hypertension Paternal Grandmother     Cancer Paternal Grandfather         lung       Social History     Socioeconomic History    Marital status: SINGLE     Spouse name: Not on file    Number of children: Not on file    Years of education: Not on file    Highest education level: Not on file   Tobacco Use    Smoking status: Never Smoker    Smokeless tobacco: Never Used   Substance and Sexual Activity    Alcohol use: No    Drug use: Never    Sexual activity: Not Currently     Social Determinants of Health     Financial Resource Strain:     Difficulty of Paying Living Expenses:    Food Insecurity:     Worried About Running Out of Food in the Last Year:     [address] in the Last Year:    Transportation Needs:     Lack of Transportation (Medical):      Lack of Transportation (Non-Medical):    Physical Activity:     Days of Exercise per Week:     Minutes of Exercise per Session:    Stress:     Feeling of Stress :    Social Connections:     Frequency of Communication with Friends and Family:     Frequency of Social Gatherings with Friends and Family:     Attends Holiness Services:     Active Member of Clubs or Organizations:     Attends Club or Organization Meetings:     Marital Status:        Review of Systems   Constitutional: Positive for weight loss. Negative for chills, fever and malaise/fatigue. HENT: Negative for congestion, hearing loss, sore throat and tinnitus. Eyes: Negative for blurred vision, pain and discharge. Respiratory: Negative for cough, shortness of breath and wheezing. Cardiovascular: Negative for chest pain, palpitations, orthopnea, claudication and leg swelling. Gastrointestinal: Negative for abdominal pain, constipation and heartburn. Genitourinary: Negative for dysuria, frequency and urgency. Musculoskeletal: Negative for falls, joint pain and myalgias. Skin: Negative for itching and rash. Neurological: Negative for dizziness, tingling, tremors and headaches. Endo/Heme/Allergies: Negative for environmental allergies and polydipsia. Psychiatric/Behavioral: Negative for depression, substance abuse and suicidal ideas. The patient is not nervous/anxious and does not have insomnia. Visit Vitals  /71 (BP 1 Location: Left upper arm)   Pulse 86   Temp 98.1 °F (36.7 °C) (Temporal)   Resp 18   Ht 5' 3\" (1.6 m)   Wt 151 lb 2 oz (68.5 kg)   SpO2 100%   BMI 26.77 kg/m²     Physical Exam  Vitals reviewed. Constitutional:       General: She is not in acute distress. Appearance: Normal appearance. She is normal weight. She is not ill-appearing. HENT:      Head: Normocephalic and atraumatic. Right Ear: Tympanic membrane, ear canal and external ear normal.      Left Ear: Tympanic membrane, ear canal and external ear normal.      Nose: Nose normal. No congestion or rhinorrhea.       Mouth/Throat: Mouth: Mucous membranes are moist.      Pharynx: No posterior oropharyngeal erythema. Eyes:      General:         Right eye: No discharge. Left eye: No discharge. Extraocular Movements: Extraocular movements intact. Conjunctiva/sclera: Conjunctivae normal.      Pupils: Pupils are equal, round, and reactive to light. Comments: Cornea anterior chamber and iris are normal.   Neck:      Trachea: No tracheal deviation. Cardiovascular:      Rate and Rhythm: Normal rate and regular rhythm. Pulses: Normal pulses. Heart sounds: Normal heart sounds. No murmur heard. No friction rub. No gallop. Pulmonary:      Effort: Pulmonary effort is normal. No respiratory distress. Breath sounds: Normal breath sounds. No wheezing or rhonchi. Chest:      Chest wall: No tenderness. Abdominal:      General: Bowel sounds are normal. There is no distension. Palpations: Abdomen is soft. There is no mass. Tenderness: There is no abdominal tenderness. There is no guarding or rebound. Musculoskeletal:         General: No tenderness or deformity. Cervical back: Normal range of motion and neck supple. Right lower leg: No edema. Left lower leg: No edema. Lymphadenopathy:      Cervical: No cervical adenopathy. Skin:     General: Skin is warm and dry. Coloration: Skin is not pale. Findings: No erythema or rash. Neurological:      General: No focal deficit present. Mental Status: She is alert and oriented to person, place, and time. Cranial Nerves: No cranial nerve deficit. Motor: No abnormal muscle tone. Deep Tendon Reflexes: Reflexes are normal and symmetric. Reflexes normal.      Comments: Cranial nerves II through XII are intact sensory and motor. Biceps triceps knee and ankle DTRs are normal and symmetrical.   Psychiatric:         Mood and Affect: Mood normal.         Behavior: Behavior normal.         Thought Content:  Thought content normal.         Judgment: Judgment normal.           ICD-10-CM ICD-9-CM    1. Abnormal weight loss  R63.4 783.21 COLLECTION VENOUS BLOOD,VENIPUNCTURE      CBC WITH AUTOMATED DIFF      METABOLIC PANEL, COMPREHENSIVE      LIPID PANEL      CBC WITH AUTOMATED DIFF      METABOLIC PANEL, COMPREHENSIVE      LIPID PANEL   2. Encounter for hepatitis C screening test for low risk patient  Z11.59 V73.89 HEPATITIS C AB      HEPATITIS C AB   3. Autism  F84.0 299.00    4. Attention deficit disorder (ADD) without hyperactivity  F98.8 314.00    5. History of depression  Z86.59 V11.8    6. Mentally challenged  F79 319        Orders Placed This Encounter    CBC WITH AUTOMATED DIFF     Standing Status:   Future     Number of Occurrences:   1     Standing Expiration Date:   1589    METABOLIC PANEL, COMPREHENSIVE     Standing Status:   Future     Number of Occurrences:   1     Standing Expiration Date:   2021    LIPID PANEL     Standing Status:   Future     Number of Occurrences:   1     Standing Expiration Date:   2021    HEPATITIS C AB     Standing Status:   Future     Number of Occurrences:   1     Standing Expiration Date:   2022    COLLECTION VENOUS BLOOD,VENIPUNCTURE    medroxyPROGESTERone (DEPO-PROVERA) 150 mg/mL injection     Si mg by IntraMUSCular route.  cholecalciferol (Vitamin D3) 25 mcg (1,000 unit) cap     Sig: Take  by mouth daily. Follow-up and Dispositions    · Return in about 6 weeks (around 2021), or if symptoms worsen or fail to improve.          Lang Ward MD

## 2021-07-14 NOTE — PROGRESS NOTES
1. Have you been to the ER, urgent care clinic since your last visit? Hospitalized since your last visit? No    2. Have you seen or consulted any other health care providers outside of the [address] [place] [address] since your last visit? Include any pap smears or colon screening.  Yes When: family practice 4-12-21

## 2021-07-15 LAB
ALBUMIN SERPL-MCNC: 4.5 G/DL (ref 3.9–5)
ALBUMIN/GLOB SERPL: 1.7 {RATIO} (ref 1.2–2.2)
ALP SERPL-CCNC: 69 IU/L (ref 48–121)
ALT SERPL-CCNC: 9 IU/L (ref 0–32)
AST SERPL-CCNC: 12 IU/L (ref 0–40)
BASOPHILS # BLD AUTO: 0 X10E3/UL (ref 0–0.2)
BASOPHILS NFR BLD AUTO: 1 %
BILIRUB SERPL-MCNC: 0.6 MG/DL (ref 0–1.2)
BUN SERPL-MCNC: 11 MG/DL (ref 6–20)
BUN/CREAT SERPL: 15 (ref 9–23)
CALCIUM SERPL-MCNC: 9.3 MG/DL (ref 8.7–10.2)
CHLORIDE SERPL-SCNC: 105 MMOL/L (ref 96–106)
CHOLEST SERPL-MCNC: 227 MG/DL (ref 100–199)
CO2 SERPL-SCNC: 21 MMOL/L (ref 20–29)
CREAT SERPL-MCNC: 0.74 MG/DL (ref 0.57–1)
EOSINOPHIL # BLD AUTO: 0.1 X10E3/UL (ref 0–0.4)
EOSINOPHIL NFR BLD AUTO: 1 %
ERYTHROCYTE [DISTWIDTH] IN BLOOD BY AUTOMATED COUNT: 12.1 % (ref 11.7–15.4)
GLOBULIN SER CALC-MCNC: 2.7 G/DL (ref 1.5–4.5)
GLUCOSE SERPL-MCNC: 92 MG/DL (ref 65–99)
HCT VFR BLD AUTO: 39.1 % (ref 34–46.6)
HCV AB S/CO SERPL IA: <0.1 S/CO RATIO (ref 0–0.9)
HDLC SERPL-MCNC: 70 MG/DL
HGB BLD-MCNC: 13.3 G/DL (ref 11.1–15.9)
IMM GRANULOCYTES # BLD AUTO: 0 X10E3/UL (ref 0–0.1)
IMM GRANULOCYTES NFR BLD AUTO: 0 %
IMP & REVIEW OF LAB RESULTS: NORMAL
LDLC SERPL CALC-MCNC: 140 MG/DL (ref 0–99)
LYMPHOCYTES # BLD AUTO: 1.5 X10E3/UL (ref 0.7–3.1)
LYMPHOCYTES NFR BLD AUTO: 32 %
MCH RBC QN AUTO: 30.5 PG (ref 26.6–33)
MCHC RBC AUTO-ENTMCNC: 34 G/DL (ref 31.5–35.7)
MCV RBC AUTO: 90 FL (ref 79–97)
MONOCYTES # BLD AUTO: 0.4 X10E3/UL (ref 0.1–0.9)
MONOCYTES NFR BLD AUTO: 9 %
NEUTROPHILS # BLD AUTO: 2.7 X10E3/UL (ref 1.4–7)
NEUTROPHILS NFR BLD AUTO: 57 %
PLATELET # BLD AUTO: 242 X10E3/UL (ref 150–450)
POTASSIUM SERPL-SCNC: 3.8 MMOL/L (ref 3.5–5.2)
PROT SERPL-MCNC: 7.2 G/DL (ref 6–8.5)
RBC # BLD AUTO: 4.36 X10E6/UL (ref 3.77–5.28)
SODIUM SERPL-SCNC: 139 MMOL/L (ref 134–144)
TRIGL SERPL-MCNC: 95 MG/DL (ref 0–149)
VLDLC SERPL CALC-MCNC: 17 MG/DL (ref 5–40)
WBC # BLD AUTO: 4.8 X10E3/UL (ref 3.4–10.8)

## 2022-03-18 PROBLEM — F98.8 ATTENTION DEFICIT DISORDER (ADD) WITHOUT HYPERACTIVITY: Status: ACTIVE | Noted: 2021-07-14

## 2022-03-19 PROBLEM — F84.0 AUTISM: Status: ACTIVE | Noted: 2021-07-14

## 2022-03-19 PROBLEM — F79 MENTALLY CHALLENGED: Status: ACTIVE | Noted: 2021-07-14

## 2022-03-19 PROBLEM — Z86.59 HISTORY OF DEPRESSION: Status: ACTIVE | Noted: 2021-07-14

## 2022-03-20 PROBLEM — R63.4 ABNORMAL WEIGHT LOSS: Status: ACTIVE | Noted: 2021-07-14

## 2024-05-31 ENCOUNTER — OFFICE VISIT (OUTPATIENT)
Age: 34
End: 2024-05-31
Payer: MEDICAID

## 2024-05-31 VITALS
DIASTOLIC BLOOD PRESSURE: 80 MMHG | BODY MASS INDEX: 22.5 KG/M2 | TEMPERATURE: 98.4 F | SYSTOLIC BLOOD PRESSURE: 121 MMHG | OXYGEN SATURATION: 100 % | WEIGHT: 127 LBS | HEIGHT: 63 IN | HEART RATE: 83 BPM

## 2024-05-31 DIAGNOSIS — F79 MENTALLY CHALLENGED: ICD-10-CM

## 2024-05-31 DIAGNOSIS — F84.0 AUTISM: ICD-10-CM

## 2024-05-31 DIAGNOSIS — F98.8 ATTENTION DEFICIT DISORDER (ADD) WITHOUT HYPERACTIVITY: ICD-10-CM

## 2024-05-31 DIAGNOSIS — Z86.59 HISTORY OF DEPRESSION: ICD-10-CM

## 2024-05-31 DIAGNOSIS — R63.4 ABNORMAL WEIGHT LOSS: ICD-10-CM

## 2024-05-31 DIAGNOSIS — F41.9 ANXIETY: Primary | ICD-10-CM

## 2024-05-31 PROCEDURE — 99213 OFFICE O/P EST LOW 20 MIN: CPT | Performed by: FAMILY MEDICINE

## 2024-05-31 RX ORDER — SERTRALINE HYDROCHLORIDE 25 MG/1
TABLET, FILM COATED ORAL
COMMUNITY
End: 2024-05-31

## 2024-05-31 RX ORDER — PAROXETINE HYDROCHLORIDE 20 MG/1
20 TABLET, FILM COATED ORAL DAILY
Qty: 30 TABLET | Refills: 5 | Status: SHIPPED | OUTPATIENT
Start: 2024-05-31

## 2024-05-31 SDOH — ECONOMIC STABILITY: HOUSING INSECURITY
IN THE LAST 12 MONTHS, WAS THERE A TIME WHEN YOU DID NOT HAVE A STEADY PLACE TO SLEEP OR SLEPT IN A SHELTER (INCLUDING NOW)?: NO

## 2024-05-31 SDOH — ECONOMIC STABILITY: INCOME INSECURITY: HOW HARD IS IT FOR YOU TO PAY FOR THE VERY BASICS LIKE FOOD, HOUSING, MEDICAL CARE, AND HEATING?: NOT HARD AT ALL

## 2024-05-31 SDOH — ECONOMIC STABILITY: FOOD INSECURITY: WITHIN THE PAST 12 MONTHS, YOU WORRIED THAT YOUR FOOD WOULD RUN OUT BEFORE YOU GOT MONEY TO BUY MORE.: NEVER TRUE

## 2024-05-31 SDOH — ECONOMIC STABILITY: FOOD INSECURITY: WITHIN THE PAST 12 MONTHS, THE FOOD YOU BOUGHT JUST DIDN'T LAST AND YOU DIDN'T HAVE MONEY TO GET MORE.: NEVER TRUE

## 2024-05-31 ASSESSMENT — PATIENT HEALTH QUESTIONNAIRE - PHQ9
SUM OF ALL RESPONSES TO PHQ QUESTIONS 1-9: 0
SUM OF ALL RESPONSES TO PHQ9 QUESTIONS 1 & 2: 0
2. FEELING DOWN, DEPRESSED OR HOPELESS: NOT AT ALL
1. LITTLE INTEREST OR PLEASURE IN DOING THINGS: NOT AT ALL
SUM OF ALL RESPONSES TO PHQ QUESTIONS 1-9: 0

## 2024-05-31 ASSESSMENT — ANXIETY QUESTIONNAIRES
6. BECOMING EASILY ANNOYED OR IRRITABLE: NOT AT ALL
IF YOU CHECKED OFF ANY PROBLEMS ON THIS QUESTIONNAIRE, HOW DIFFICULT HAVE THESE PROBLEMS MADE IT FOR YOU TO DO YOUR WORK, TAKE CARE OF THINGS AT HOME, OR GET ALONG WITH OTHER PEOPLE: NOT DIFFICULT AT ALL
3. WORRYING TOO MUCH ABOUT DIFFERENT THINGS: NOT AT ALL
GAD7 TOTAL SCORE: 0
7. FEELING AFRAID AS IF SOMETHING AWFUL MIGHT HAPPEN: NOT AT ALL
2. NOT BEING ABLE TO STOP OR CONTROL WORRYING: NOT AT ALL
1. FEELING NERVOUS, ANXIOUS, OR ON EDGE: NOT AT ALL
5. BEING SO RESTLESS THAT IT IS HARD TO SIT STILL: NOT AT ALL
4. TROUBLE RELAXING: NOT AT ALL

## 2024-05-31 NOTE — PROGRESS NOTES
Magdalene Gatica is a 33 y.o. female who presents with the following:  Chief Complaint   Patient presents with    Weight Loss     Without reason       Patient with a history of autism with mild mental retardation and a history of ADD as well as depression is lost about 2627 pounds without seeming to be trying.  The father states that his daughter is currently on sertraline and seems to be doing a little bit better on this but he and his wife are still concerned about how much weight she is lost.  All of her lab work was reviewed and basically has been normal other than an elevated cholesterol and her father has a history of high cholesterol.        No Known Allergies    Current Outpatient Medications   Medication Sig Dispense Refill    PARoxetine (PAXIL) 20 MG tablet Take 1 tablet by mouth daily 30 tablet 5     No current facility-administered medications for this visit.        Past Medical History:   Diagnosis Date    ADD (attention deficit disorder)     from [place] Hx    Autism     Moderate mental retardation     from [place] History    Psychiatric disorder     depression       No past surgical history on file.    Family History   Problem Relation Age of Onset    Hypertension Father     Cancer Paternal Grandfather         lung    Hypertension Paternal Grandmother        Social History     Socioeconomic History    Marital status: Single   Tobacco Use    Smoking status: Never    Smokeless tobacco: Never   Substance and Sexual Activity    Alcohol use: No    Drug use: Never     Social Determinants of Health     Financial Resource Strain: Low Risk  (5/31/2024)    Overall Financial Resource Strain (CARDIA)     Difficulty of Paying Living Expenses: Not hard at all   Food Insecurity: No Food Insecurity (5/31/2024)    Hunger Vital Sign     Worried About Running Out of Food in the Last Year: Never true     Ran Out of Food in the Last Year: Never true   Transportation Needs: Unknown (5/31/2024)    PRAPARE - Transportation

## 2024-07-16 ENCOUNTER — OFFICE VISIT (OUTPATIENT)
Age: 34
End: 2024-07-16
Payer: MEDICAID

## 2024-07-16 VITALS
HEIGHT: 63 IN | SYSTOLIC BLOOD PRESSURE: 121 MMHG | BODY MASS INDEX: 21.79 KG/M2 | HEART RATE: 76 BPM | DIASTOLIC BLOOD PRESSURE: 80 MMHG | OXYGEN SATURATION: 100 % | WEIGHT: 123 LBS | TEMPERATURE: 97.8 F | RESPIRATION RATE: 22 BRPM

## 2024-07-16 DIAGNOSIS — F41.9 ANXIETY: Primary | ICD-10-CM

## 2024-07-16 PROCEDURE — 99213 OFFICE O/P EST LOW 20 MIN: CPT | Performed by: FAMILY MEDICINE

## 2024-07-16 RX ORDER — PAROXETINE HYDROCHLORIDE 40 MG/1
40 TABLET, FILM COATED ORAL DAILY
Qty: 30 TABLET | Refills: 5 | Status: SHIPPED | OUTPATIENT
Start: 2024-07-16

## 2024-07-16 SDOH — ECONOMIC STABILITY: FOOD INSECURITY: WITHIN THE PAST 12 MONTHS, THE FOOD YOU BOUGHT JUST DIDN'T LAST AND YOU DIDN'T HAVE MONEY TO GET MORE.: NEVER TRUE

## 2024-07-16 SDOH — ECONOMIC STABILITY: FOOD INSECURITY: WITHIN THE PAST 12 MONTHS, YOU WORRIED THAT YOUR FOOD WOULD RUN OUT BEFORE YOU GOT MONEY TO BUY MORE.: NEVER TRUE

## 2024-07-16 SDOH — ECONOMIC STABILITY: INCOME INSECURITY: HOW HARD IS IT FOR YOU TO PAY FOR THE VERY BASICS LIKE FOOD, HOUSING, MEDICAL CARE, AND HEATING?: NOT HARD AT ALL

## 2024-07-16 ASSESSMENT — PATIENT HEALTH QUESTIONNAIRE - PHQ9
SUM OF ALL RESPONSES TO PHQ9 QUESTIONS 1 & 2: 0
2. FEELING DOWN, DEPRESSED OR HOPELESS: NOT AT ALL
SUM OF ALL RESPONSES TO PHQ QUESTIONS 1-9: 0
1. LITTLE INTEREST OR PLEASURE IN DOING THINGS: NOT AT ALL
SUM OF ALL RESPONSES TO PHQ QUESTIONS 1-9: 0

## 2024-07-16 ASSESSMENT — ANXIETY QUESTIONNAIRES
4. TROUBLE RELAXING: NOT AT ALL
7. FEELING AFRAID AS IF SOMETHING AWFUL MIGHT HAPPEN: NOT AT ALL
1. FEELING NERVOUS, ANXIOUS, OR ON EDGE: NOT AT ALL
IF YOU CHECKED OFF ANY PROBLEMS ON THIS QUESTIONNAIRE, HOW DIFFICULT HAVE THESE PROBLEMS MADE IT FOR YOU TO DO YOUR WORK, TAKE CARE OF THINGS AT HOME, OR GET ALONG WITH OTHER PEOPLE: NOT DIFFICULT AT ALL
GAD7 TOTAL SCORE: 0
6. BECOMING EASILY ANNOYED OR IRRITABLE: NOT AT ALL
5. BEING SO RESTLESS THAT IT IS HARD TO SIT STILL: NOT AT ALL
3. WORRYING TOO MUCH ABOUT DIFFERENT THINGS: NOT AT ALL
2. NOT BEING ABLE TO STOP OR CONTROL WORRYING: NOT AT ALL

## 2024-07-16 NOTE — PROGRESS NOTES
Magdalene Gatica is a 33 y.o. female who presents with the following:  Chief Complaint   Patient presents with    Weight Loss       The patient's father is complaining that his daughter is continuously losing weight and she was at 127 on her last visit and is down now to 123.  The literature indicates that I can go up to 40 mg of the paroxetine for anxiety which is why I believe the patient does not have an appetite.        No Known Allergies    Current Outpatient Medications   Medication Sig Dispense Refill    PARoxetine (PAXIL) 40 MG tablet Take 1 tablet by mouth daily 30 tablet 5     No current facility-administered medications for this visit.        Past Medical History:   Diagnosis Date    ADD (attention deficit disorder)     from [place] Hx    Autism     Moderate mental retardation     from [place] History    Psychiatric disorder     depression       No past surgical history on file.    Family History   Problem Relation Age of Onset    Hypertension Father     Cancer Paternal Grandfather         lung    Hypertension Paternal Grandmother        Social History     Socioeconomic History    Marital status: Single   Tobacco Use    Smoking status: Never    Smokeless tobacco: Never   Substance and Sexual Activity    Alcohol use: No    Drug use: Never     Social Determinants of Health     Financial Resource Strain: Low Risk  (7/16/2024)    Overall Financial Resource Strain (CARDIA)     Difficulty of Paying Living Expenses: Not hard at all   Food Insecurity: No Food Insecurity (7/16/2024)    Hunger Vital Sign     Worried About Running Out of Food in the Last Year: Never true     Ran Out of Food in the Last Year: Never true   Transportation Needs: Unknown (7/16/2024)    PRAPARE - Transportation     Lack of Transportation (Non-Medical): No   Housing Stability: Unknown (7/16/2024)    Housing Stability Vital Sign     Unstable Housing in the Last Year: No       Review of Systems   Unable to perform ROS: Patient nonverbal

## 2024-07-16 NOTE — PROGRESS NOTES
\"Have you been to the ER, urgent care clinic since your last visit?  Hospitalized since your last visit?\"    NO    “Have you seen or consulted any other health care providers outside of [facility] since your last visit?”    NO     “Have you had a pap smear?”    NO    No cervical cancer screening on file             Click Here for Release of Records Request

## 2024-09-16 ENCOUNTER — OFFICE VISIT (OUTPATIENT)
Age: 34
End: 2024-09-16
Payer: MEDICAID

## 2024-09-16 VITALS
HEART RATE: 84 BPM | DIASTOLIC BLOOD PRESSURE: 77 MMHG | BODY MASS INDEX: 21.11 KG/M2 | OXYGEN SATURATION: 100 % | RESPIRATION RATE: 18 BRPM | WEIGHT: 119.13 LBS | SYSTOLIC BLOOD PRESSURE: 117 MMHG | HEIGHT: 63 IN | TEMPERATURE: 98.1 F

## 2024-09-16 DIAGNOSIS — R63.4 WEIGHT LOSS: ICD-10-CM

## 2024-09-16 DIAGNOSIS — R39.9 UTI SYMPTOMS: Primary | ICD-10-CM

## 2024-09-16 LAB
BILIRUBIN, URINE, POC: NORMAL
BLOOD URINE, POC: NEGATIVE
GLUCOSE URINE, POC: NEGATIVE
KETONES, URINE, POC: NORMAL
LEUKOCYTE ESTERASE, URINE, POC: NEGATIVE
NITRITE, URINE, POC: NEGATIVE
PH, URINE, POC: 6.5 (ref 4.6–8)
PROTEIN,URINE, POC: NEGATIVE
SPECIFIC GRAVITY, URINE, POC: 1.02 (ref 1–1.03)
URINALYSIS CLARITY, POC: NORMAL
URINALYSIS COLOR, POC: YELLOW
UROBILINOGEN, POC: NORMAL

## 2024-09-16 PROCEDURE — 81003 URINALYSIS AUTO W/O SCOPE: CPT | Performed by: NURSE PRACTITIONER

## 2024-09-16 PROCEDURE — 99214 OFFICE O/P EST MOD 30 MIN: CPT | Performed by: NURSE PRACTITIONER

## 2024-09-16 ASSESSMENT — PATIENT HEALTH QUESTIONNAIRE - PHQ9
SUM OF ALL RESPONSES TO PHQ QUESTIONS 1-9: 0
SUM OF ALL RESPONSES TO PHQ QUESTIONS 1-9: 0
SUM OF ALL RESPONSES TO PHQ9 QUESTIONS 1 & 2: 0
1. LITTLE INTEREST OR PLEASURE IN DOING THINGS: NOT AT ALL
SUM OF ALL RESPONSES TO PHQ QUESTIONS 1-9: 0
SUM OF ALL RESPONSES TO PHQ QUESTIONS 1-9: 0
2. FEELING DOWN, DEPRESSED OR HOPELESS: NOT AT ALL

## 2024-09-16 ASSESSMENT — ENCOUNTER SYMPTOMS
GASTROINTESTINAL NEGATIVE: 1
EYES NEGATIVE: 1
RESPIRATORY NEGATIVE: 1

## 2024-11-08 NOTE — PROGRESS NOTES
[facility] [place] at [facility] CONSULTATION   Office Phone: [phone], Office Fax: [phone]    Date: 11/11/24    PATIENT PROFILE: Ms. Magdalene Gatcia is a 34 y.o. who presents with the following diagnoses: weight loss    Patient Care Team:  Twila Hayes, DANIELLE - NP as PCP - General (Nurse Practitioner)  Twila Hayes APRN - NP as PCP - Empaneled Provider     HEMATOLOGIC/ONCOLOGIC HISTORY  #Weight Loss  -2018- had period of unintentional weight loss  -November 2023 to April 2024- lost 30 lbs unintentionally  -3/2024- CYN, RF, CRP, ESR, TPO ab negative per PCP  -evaluated by GI, plans to move forward with nutritional intervention prior to any invasive testing  -CBC's notably normal throughout    HISTORY OF PRESENT ILLNESS:   Mrs. Gatica is a 33 yo w/ pmhx of autism, ADD presenting for weight loss concerns.    Patient with history of unintentional weight loss as described above.  Patient has followed with her PCP as well as [facility]  She has had ongoing follow up over the last several months with some stability/slight improvement in her weight trend.  Patient is nonverbal and able to communicate some through written communication.    Patient and family report the above history.  Her father notes that she has had stable appetite and despite this has had weight loss.  Specifically they deny any adenopathy, night sweats, fevers, focal pain, bowel changes, melena/hematochezia.  She has no prior history of premalignant or malignant conditions.      ROS:  A complete review of systems was obtained, negative except as described above. See HPI and Interval History for positive and/or notable ROS findings.    PAST MEDICAL HISTORY:   has a past medical history of ADD (attention deficit disorder), Autism, Moderate mental retardation, and Psychiatric disorder.    PAST SURGICAL HISTORY:   has no past surgical history on file.     FAMILY HISTORY:  family history includes Cancer in her

## 2024-11-11 ENCOUNTER — OFFICE VISIT (OUTPATIENT)
Age: 34
End: 2024-11-11
Payer: MEDICAID

## 2024-11-11 DIAGNOSIS — R63.4 ABNORMAL WEIGHT LOSS: Primary | ICD-10-CM

## 2024-11-11 PROCEDURE — 99203 OFFICE O/P NEW LOW 30 MIN: CPT | Performed by: STUDENT IN AN ORGANIZED HEALTH CARE EDUCATION/TRAINING PROGRAM

## 2024-11-13 VITALS
WEIGHT: 119.2 LBS | HEART RATE: 82 BPM | DIASTOLIC BLOOD PRESSURE: 80 MMHG | SYSTOLIC BLOOD PRESSURE: 131 MMHG | OXYGEN SATURATION: 97 % | BODY MASS INDEX: 21.12 KG/M2 | TEMPERATURE: 97.4 F